# Patient Record
(demographics unavailable — no encounter records)

---

## 2025-01-06 NOTE — HISTORY OF PRESENT ILLNESS
[Postpartum Follow Up] : postpartum follow up [Complications:___] : complications include: [unfilled] [Normal Spontaneous Vaginal Delivery] : normal spontaneous vaginal delivery [Pertussis Vaccine] : Pertussis vaccine administered [Last Pap Date: ___] : Last Pap Date: [unfilled] [Delivery Date: ___] : on [unfilled] [] : delivered by vaginal delivery [Male] : Delivery History: baby boy [Boy] : baby is a boy [Infant's Name ___] : [unfilled] [___ Lbs] : [unfilled] lbs [Not Circumcised] : not circumcised [Living at Home] : is currently living at home [Breastfeeding] : currently nursing [Intended Contraception] : Intended Contraception: [Condoms] : condoms [Doing Well] : is doing well [Excellent Pain Control] : has excellent pain control [None] : no vaginal bleeding [Examination Of The Breasts] : breasts are normal [No Sign of Infection] : is showing no signs of infection [Back to Normal] : is back to normal in size [Normal] : the vagina was normal [Rhogam] : Rhogam was not administered [Rubella Vaccine] : Rubella vaccine was not administered [BTL] : no tubal ligation [BF with Difficulty] : nursing without difficulty [Resumed Menses] : has not resumed her menses [Resumed [place]] : has not resumed intercourse [Cervix Sample Taken] : cervical sample not taken for a Pap smear [de-identified] : First degree laceration  [de-identified] : perineum well healed no issues [de-identified] : follow up in June 2025 for annua,

## 2025-01-06 NOTE — HISTORY OF PRESENT ILLNESS
[Postpartum Follow Up] : postpartum follow up [Complications:___] : complications include: [unfilled] [Normal Spontaneous Vaginal Delivery] : normal spontaneous vaginal delivery [Pertussis Vaccine] : Pertussis vaccine administered [Last Pap Date: ___] : Last Pap Date: [unfilled] [Delivery Date: ___] : on [unfilled] [] : delivered by vaginal delivery [Male] : Delivery History: baby boy [Boy] : baby is a boy [Infant's Name ___] : [unfilled] [___ Lbs] : [unfilled] lbs [Not Circumcised] : not circumcised [Living at Home] : is currently living at home [Breastfeeding] : currently nursing [Intended Contraception] : Intended Contraception: [Condoms] : condoms [Doing Well] : is doing well [Excellent Pain Control] : has excellent pain control [None] : no vaginal bleeding [Examination Of The Breasts] : breasts are normal [No Sign of Infection] : is showing no signs of infection [Back to Normal] : is back to normal in size [Normal] : the vagina was normal [Rhogam] : Rhogam was not administered [Rubella Vaccine] : Rubella vaccine was not administered [BTL] : no tubal ligation [BF with Difficulty] : nursing without difficulty [Resumed Menses] : has not resumed her menses [Resumed [place]] : has not resumed intercourse [Cervix Sample Taken] : cervical sample not taken for a Pap smear [de-identified] : First degree laceration  [de-identified] : perineum well healed no issues [de-identified] : follow up in June 2025 for annua,